# Patient Record
(demographics unavailable — no encounter records)

---

## 2025-07-14 NOTE — HISTORY OF PRESENT ILLNESS
[No Personal or Family History of Easy Bruising, Bleeding, or Issues with General Anesthesia] : No Personal or Family History of easy bruising, bleeding, or issues with general anesthesia [No change in the review of systems as noted in prior visit date ___] : No change in the review of systems as noted in prior visit date of [unfilled] [de-identified] : History of moderate sleep apnea on overnight PSG in 2023 +Snoring with restless sleep Diagnosed with ADHD +Bedwetting  +Daytime fatigue +Nasal airway obstruction and open mouth breathing at night  No recent ear infections No recent throat infections No speech or language delay Speech articulation issues  Concern for tongue tie Normal audiogram in 2023  History of vocal nodules Hoarseness has resolved.

## 2025-07-14 NOTE — PROCEDURE
[FreeTextEntry1] : Nasal Endoscopy [FreeTextEntry2] : Chronic Rhinitis [FreeTextEntry3] : After informed verbal consent is obtained, the fiberoptic nasal endoscope is passed via the right nasal cavity. The osteomeatal complex is clear with no polyposis or purulence. The sphenoethmoidal recess is clear with no polyposis or purulence. The choana is patent.  The fiberoptic nasal endoscope is passed via the left nasal cavity. The osteomeatal complex is clear with no polyposis or purulence. The sphenoethmoidal recess is clear with no polyposis or purulence. The choana is patent.  There is 75% obstruction of the nasopharynx with adenoid tissue.

## 2025-07-14 NOTE — CONSULT LETTER
[Courtesy Letter:] : I had the pleasure of seeing your patient, [unfilled], in my office today. [Sincerely,] : Sincerely, [FreeTextEntry2] : Dr. Matthew Duvall [address] [place] [address] [FreeTextEntry3] : Quinton Jimenez MD Chief, Pediatric Otolaryngology [facility] and [facility] [place] Professor of Otolaryngology [facility] [employer] at [facility]

## 2025-07-14 NOTE — PHYSICAL EXAM
[3+] : 3+ [Normal muscle strength, symmetry and tone of facial, head and neck musculature] : normal muscle strength, symmetry and tone of facial, head and neck musculature [Normal] : no cervical lymphadenopathy [Increased Work of Breathing] : no increased work of breathing with use of accessory muscles and retractions [de-identified] : tongue tie